# Patient Record
Sex: MALE | Race: WHITE | Employment: FULL TIME | URBAN - METROPOLITAN AREA
[De-identification: names, ages, dates, MRNs, and addresses within clinical notes are randomized per-mention and may not be internally consistent; named-entity substitution may affect disease eponyms.]

---

## 2017-08-12 ENCOUNTER — APPOINTMENT (OUTPATIENT)
Dept: GENERAL RADIOLOGY | Age: 32
End: 2017-08-12
Attending: NURSE PRACTITIONER
Payer: OTHER GOVERNMENT

## 2017-08-12 ENCOUNTER — HOSPITAL ENCOUNTER (EMERGENCY)
Age: 32
Discharge: HOME | End: 2017-08-12
Attending: EMERGENCY MEDICINE
Payer: OTHER GOVERNMENT

## 2017-08-12 VITALS
OXYGEN SATURATION: 98 % | RESPIRATION RATE: 18 BRPM | HEIGHT: 72 IN | WEIGHT: 190 LBS | SYSTOLIC BLOOD PRESSURE: 116 MMHG | HEART RATE: 79 BPM | DIASTOLIC BLOOD PRESSURE: 73 MMHG | TEMPERATURE: 98 F | BODY MASS INDEX: 25.73 KG/M2

## 2017-08-12 DIAGNOSIS — S49.92XA SHOULDER INJURY, LEFT, INITIAL ENCOUNTER: Primary | ICD-10-CM

## 2017-08-12 PROCEDURE — 73030 X-RAY EXAM OF SHOULDER: CPT

## 2017-08-12 PROCEDURE — L3650 SO 8 ABD RESTRAINT PRE OTS: HCPCS

## 2017-08-12 PROCEDURE — 99281 EMR DPT VST MAYX REQ PHY/QHP: CPT

## 2017-08-12 RX ORDER — HYDROCODONE BITARTRATE AND ACETAMINOPHEN 5; 325 MG/1; MG/1
1 TABLET ORAL
Qty: 12 TAB | Refills: 0 | Status: SHIPPED | OUTPATIENT
Start: 2017-08-12

## 2017-08-12 RX ORDER — KETOROLAC TROMETHAMINE 30 MG/ML
30 INJECTION, SOLUTION INTRAMUSCULAR; INTRAVENOUS
Status: DISCONTINUED | OUTPATIENT
Start: 2017-08-12 | End: 2017-08-12 | Stop reason: HOSPADM

## 2017-08-12 RX ORDER — IBUPROFEN 800 MG/1
800 TABLET ORAL
Qty: 20 TAB | Refills: 0 | Status: SHIPPED | OUTPATIENT
Start: 2017-08-12 | End: 2017-08-19

## 2017-08-12 NOTE — ED TRIAGE NOTES
Patient reports he was doing a crossfit competition and was doing a \"thruster\" when he felt a pull and immediately had a stinging sensation in his left shoulder, unable to externally rotate without significant pain.   Ice pack in place upon arrival.

## 2017-08-12 NOTE — LETTER
[address] 
[facility] EMERGENCY DEPT 
[address] [facility] [address] [id number] [phone] Work/School Note Date: 8/12/2017 To Whom It May concern: 
 
Alexandra Hernandez was seen and treated today in the emergency room by the following provider(s): 
Attending Provider: Sonali Gomez MD 
Nurse Practitioner: Sage Rosario NP. Alexandra Hernandez has injury to his L shoulder. He may return to full functioning of his work duties after clearance with orthopedics. Sincerely, Sage Rosario NP

## 2017-08-12 NOTE — DISCHARGE INSTRUCTIONS
We hope that we have addressed all of your medical concerns. The examination and treatment you received in the Emergency Department were for an emergent problem and were not intended as complete care. It is important that you follow up with your healthcare provider(s) for ongoing care. If your symptoms worsen or do not improve as expected, and you are unable to reach your usual health care provider(s), you should return to the Emergency Department. Today's healthcare is undergoing tremendous change, and patient satisfaction surveys are one of the many tools to assess the quality of medical care. You may receive a survey from the [employer] regarding your experience in the Emergency Department. I hope that your experience has been completely positive, particularly the medical care that I provided. As such, please participate in the survey; anything less than excellent does not meet my expectations or intentions. [facility] and 8 [facility] participate in nationally recognized quality of care measures. If your blood pressure is greater than 120/80, as reported below, we urge that you seek medical care to address the potential of high blood pressure, commonly known as hypertension. Hypertension can be hereditary or can be caused by certain medical conditions, pain, stress, or \"white coat syndrome. \"       Please make an appointment with your health care provider(s) for follow up of your Emergency Department visit. VITALS:   Patient Vitals for the past 8 hrs:   Temp Pulse Resp BP SpO2   08/12/17 1739 98 °F (36.7 °C) 79 18 116/73 98 %          Thank you for allowing us to provide you with medical care today. We realize that you have many choices for your emergency care needs. Please choose us in the future for any continued health care needs.       [address],               [address]: [phone]            No results found for this or any previous visit (from the past 24 hour(s)). Xr Shoulder Lt Ap/lat Min 2 V    Result Date: 8/12/2017  INDICATION: Left shoulder pain status post performing a 'thruster.' Stinging sensation in left shoulder. Exam: Three views of the left shoulder. FINDINGS: There is no acute fracture or dislocation. Articulations are normal. Bones are well mineralized and soft tissues are normal.     IMPRESSION: No acute fracture or dislocation.

## 2017-08-13 NOTE — ED PROVIDER NOTES
HPI Comments: Alexandra Hernandez is a 28 y.o. Male who presents ambulatory with his fiance to [facility] ED with cc of L shoulder injury. Reportedly was at a [employer] competition today doing a \"thruster\" when felt a \"pop\" with subsequent burning sensation in his L shoulder. Notes hx of injury, including dislocation in the past, but no surgery to L shoulder. Has received tx for injury with PT at [facility] in the past. Pt reports no swelling. Worsening pain is only with external rotation of the arm which he cannot do. Placed arm in sling and came to the ED. Took Motrin PTA which has provided some pain relief. Does not want any pain medications at this time. Otherwise pt reports himself as healthy. R hand dominant. (-) tobacco/ ETOH/ substance abuse hx. PCP: None    There are no other complaints, changes or physical findings at this time. The history is provided by the patient. No past medical history on file. No past surgical history on file. No family history on file. Social History     Social History    Marital status: SINGLE     Spouse name: N/A    Number of children: N/A    Years of education: N/A     Occupational History    Not on file. Social History Main Topics    Smoking status: Not on file    Smokeless tobacco: Not on file    Alcohol use Not on file    Drug use: Not on file    Sexual activity: Not on file     Other Topics Concern    Not on file     Social History Narrative         ALLERGIES: Review of patient's allergies indicates no known allergies. Review of Systems   Constitutional: Negative for activity change, appetite change, chills and fever. HENT: Negative for congestion, rhinorrhea, sinus pressure, sneezing and sore throat. Eyes: Negative for pain, discharge and visual disturbance. Respiratory: Negative for cough and shortness of breath. Cardiovascular: Negative for chest pain.    Gastrointestinal: Negative for abdominal pain, diarrhea, nausea and vomiting. Genitourinary: Negative for dysuria, flank pain, frequency and urgency. Musculoskeletal: Positive for arthralgias. Negative for back pain, gait problem, joint swelling, myalgias and neck pain. Skin: Negative for color change and rash. Neurological: Negative for dizziness, speech difficulty, weakness, light-headedness, numbness and headaches. Psychiatric/Behavioral: Negative for agitation, behavioral problems and confusion. All other systems reviewed and are negative. Vitals:    08/12/17 1739   BP: 116/73   Pulse: 79   Resp: 18   Temp: 98 °F (36.7 °C)   SpO2: 98%   Weight: 86.2 kg (190 lb)   Height: 6' (1.829 m)            Physical Exam   Constitutional: He is oriented to person, place, and time. He appears well-developed and well-nourished. No distress. HENT:   Head: Normocephalic and atraumatic. Right Ear: External ear normal.   Left Ear: External ear normal.   Nose: Nose normal.   Mouth/Throat: Oropharynx is clear and moist. No oropharyngeal exudate. Eyes: Conjunctivae and EOM are normal. Pupils are equal, round, and reactive to light. Neck: Normal range of motion. Neck supple. Cardiovascular: Normal rate, regular rhythm, normal heart sounds and intact distal pulses. Pulmonary/Chest: Effort normal and breath sounds normal.   Musculoskeletal:        Left shoulder: He exhibits decreased range of motion and tenderness. He exhibits no bony tenderness, no swelling, no effusion, no crepitus, no deformity, normal pulse and normal strength. Neurological: He is alert and oriented to person, place, and time. Skin: Skin is warm and dry. Psychiatric: He has a normal mood and affect. His behavior is normal. Judgment and thought content normal.   Nursing note and vitals reviewed.        MDM  Number of Diagnoses or Management Options  Shoulder injury, left, initial encounter:   Diagnosis management comments: DDx: rotator cuff injury, shoulder fx/ dislocation/ sprain     29 yo M presents with c/o decreased ROM/ burning pain to L shoulder post injury today. No emergent x-ray findings. Placed in sling. Advised to f/u with ortho ASAP. Possible rotator cuff injury. Motrin/ Tylenol for pain. Ice on effected extremity. Avoid strenuous activity. Amount and/or Complexity of Data Reviewed  Tests in the radiology section of CPT®: ordered and reviewed  Review and summarize past medical records: yes  Discuss the patient with other providers: yes      ED Course       Procedures    LABORATORY TESTS:  No results found for this or any previous visit (from the past 12 hour(s)). IMAGING RESULTS:  XR SHOULDER LT AP/LAT MIN 2 V   Final Result        INDICATION: Left shoulder pain status post performing a 'thruster.' Stinging  sensation in left shoulder.     Exam: Three views of the left shoulder.     FINDINGS: There is no acute fracture or dislocation. Articulations are normal.  Bones are well mineralized and soft tissues are normal.     IMPRESSION  IMPRESSION: No acute fracture or dislocation.                 MEDICATIONS GIVEN:  Medications - No data to display    IMPRESSION:  1. Shoulder injury, left, initial encounter        PLAN:  1. Discharge Medication List as of 8/12/2017  6:10 PM      START taking these medications    Details   ibuprofen (MOTRIN) 800 mg tablet Take 1 Tab by mouth every six (6) hours as needed for Pain for up to 7 days. , Print, Disp-20 Tab, R-0      HYDROcodone-acetaminophen (NORCO) 5-325 mg per tablet Take 1 Tab by mouth every four (4) hours as needed for Pain. Max Daily Amount: 6 Tabs., Print, Disp-12 Tab, R-0           2.    Follow-up Information     Follow up With Details Comments [address], [place] Schedule an appointment as soon as possible for a visit  [address] [phone]      [facility] EMERGENCY DEPT Go to As needed, If symptoms worsen [address] [id number]  [phone]        3. Return to ED if worse     Discharge Note:    The patient is ready for discharge. The patient's signs, symptoms, diagnosis, and discharge instruction have been discussed and the patient has conveyed their understanding. The patient is to follow up as recommended or return to the ER should their symptoms worsen. Plan has been discussed and the patient is in agreement.     Nishatn Saavedra, RUBY

## 2017-09-20 ENCOUNTER — HOSPITAL ENCOUNTER (OUTPATIENT)
Dept: MRI IMAGING | Age: 32
Discharge: HOME | End: 2017-09-20
Payer: COMMERCIAL

## 2017-09-20 DIAGNOSIS — M54.50 LUMBAR PAIN: ICD-10-CM

## 2017-09-20 PROCEDURE — 72148 MRI LUMBAR SPINE W/O DYE: CPT

## 2018-01-11 ENCOUNTER — ED HISTORICAL/CONVERTED ENCOUNTER (OUTPATIENT)
Dept: OTHER | Age: 33
End: 2018-01-11